# Patient Record
Sex: FEMALE | Race: WHITE | NOT HISPANIC OR LATINO | Employment: OTHER | URBAN - METROPOLITAN AREA
[De-identification: names, ages, dates, MRNs, and addresses within clinical notes are randomized per-mention and may not be internally consistent; named-entity substitution may affect disease eponyms.]

---

## 2017-10-11 NOTE — PATIENT DISCUSSION
Dry Eye Syndrome Counseling: I have discussed the diagnosis and the pathophysiology of this disease with the patient. Eyelid pathology and systemic illnesses such as Sjogren?s disease or rheumatoid arthritis may contribute to severity. Vision may be limited by dry eye, and symptoms exacerbated by environmental factors such as smoke, wind, or prolonged eye use. Treatment options include, but are not limited to, artificial tears, punctal plugs, topical cyclosporine, oral omega-3 supplements, Lipiflow, moisture goggles, and lubricating ointments. I stressed the importance of compliance with treatment.

## 2018-12-17 NOTE — PATIENT DISCUSSION
DERMATOCHALASIS BUL: Patient not bothered by uppper lids. Instructed patient to return to clinic if lids become bothersome or affecting vision. Will continue to monitor. PATIENT DEFERS LID EVALUATION AT THIS TIME.

## 2018-12-17 NOTE — PATIENT DISCUSSION
NON-EXUDATIVE ARMD, STABLE OU: Educated patient about importance of taking AREDS 2, [employer] Grid use, and UV protection. Patient should also have a healthy diet/lifestyle that includes green leafy vegetables and foods high in lutein and not to smoke any tobacco products.

## 2020-11-06 ENCOUNTER — NEW PATIENT COMPREHENSIVE (OUTPATIENT)
Dept: URBAN - METROPOLITAN AREA CLINIC 38 | Facility: CLINIC | Age: 55
End: 2020-11-06

## 2020-11-06 DIAGNOSIS — H52.03: ICD-10-CM

## 2020-11-06 DIAGNOSIS — H52.4: ICD-10-CM

## 2020-11-06 PROCEDURE — 92015 DETERMINE REFRACTIVE STATE: CPT

## 2020-11-06 PROCEDURE — 92004 COMPRE OPH EXAM NEW PT 1/>: CPT

## 2020-11-06 ASSESSMENT — KERATOMETRY
OS_AXISANGLE_DEGREES: 15
OS_K1POWER_DIOPTERS: 44
OD_K2POWER_DIOPTERS: 44.75
OD_AXISANGLE2_DEGREES: 70
OS_K2POWER_DIOPTERS: 44.75
OD_K1POWER_DIOPTERS: 44
OS_AXISANGLE2_DEGREES: 105
OD_AXISANGLE_DEGREES: 160

## 2020-11-06 ASSESSMENT — VISUAL ACUITY
OD_CC: 20/20
OS_SC: J12
OS_CC: 20/20
OD_CC: J2
OD_SC: J12
OD_SC: 20/40
OS_CC: J1
OS_SC: 20/20

## 2020-11-06 ASSESSMENT — TONOMETRY
OD_IOP_MMHG: 13
OS_IOP_MMHG: 15

## 2022-02-28 ENCOUNTER — COMPREHENSIVE EXAM (OUTPATIENT)
Dept: URBAN - METROPOLITAN AREA CLINIC 38 | Facility: CLINIC | Age: 57
End: 2022-02-28

## 2022-02-28 DIAGNOSIS — H52.4: ICD-10-CM

## 2022-02-28 DIAGNOSIS — H52.03: ICD-10-CM

## 2022-02-28 DIAGNOSIS — H52.203: ICD-10-CM

## 2022-02-28 PROCEDURE — 92015 DETERMINE REFRACTIVE STATE: CPT

## 2022-02-28 PROCEDURE — 92014 COMPRE OPH EXAM EST PT 1/>: CPT

## 2022-02-28 ASSESSMENT — VISUAL ACUITY
OS_CC: J3
OD_CC: J3
OD_CC: 20/20
OS_CC: 20/20-1

## 2022-02-28 ASSESSMENT — KERATOMETRY
OD_K2POWER_DIOPTERS: 44.75
OD_AXISANGLE_DEGREES: 160
OD_AXISANGLE2_DEGREES: 70
OD_K1POWER_DIOPTERS: 44
OS_AXISANGLE2_DEGREES: 105
OS_K2POWER_DIOPTERS: 44.75
OS_K1POWER_DIOPTERS: 44
OS_AXISANGLE_DEGREES: 15

## 2022-02-28 ASSESSMENT — TONOMETRY
OD_IOP_MMHG: 14
OS_IOP_MMHG: 14

## 2022-10-24 ENCOUNTER — EMERGENCY VISIT (OUTPATIENT)
Dept: URBAN - METROPOLITAN AREA CLINIC 38 | Facility: CLINIC | Age: 57
End: 2022-10-24

## 2022-10-24 DIAGNOSIS — H10.011: ICD-10-CM

## 2022-10-24 PROCEDURE — 92012 INTRM OPH EXAM EST PATIENT: CPT

## 2022-10-24 RX ORDER — TOBRAMYCIN AND DEXAMETHASONE 1; 3 MG/ML; MG/ML: 1 SUSPENSION/ DROPS OPHTHALMIC

## 2022-10-24 ASSESSMENT — VISUAL ACUITY
OD_CC: 20/20
OS_CC: 20/20-

## 2022-10-24 ASSESSMENT — KERATOMETRY
OS_AXISANGLE_DEGREES: 15
OS_AXISANGLE2_DEGREES: 105
OD_AXISANGLE2_DEGREES: 70
OS_K2POWER_DIOPTERS: 44.75
OS_K1POWER_DIOPTERS: 44
OD_K2POWER_DIOPTERS: 44.75
OD_K1POWER_DIOPTERS: 44
OD_AXISANGLE_DEGREES: 160

## 2022-10-24 ASSESSMENT — TONOMETRY
OS_IOP_MMHG: 14
OD_IOP_MMHG: 14

## 2022-11-10 NOTE — PATIENT DISCUSSION
At this time, the patient is not bothered, and observation recommend at this time.
Discussed condition and exacerbating conditions/situations (e.g., dry/arid environments, overhead fans, air conditioners, side effect of medications).
Early posterior capsular opacification was noted. It was not causing any decreased vision or glare at this time. No treatment was recommended at this time.
[facility] monitoring, AREDS2 vitamins, no smoking, green leafy vegetables discussed.
The patient has hypertensive retinopathy, a disorder of the retina resulting from damaged retinal blood vessels secondary to high blood pressure. Most cases require no treatment other than to control the underlying hypertension. Diagnosis, treatment and follow-up recommendations have been discussed with the patient.
no fever and no chills.

## 2023-12-29 ENCOUNTER — COMPREHENSIVE EXAM (OUTPATIENT)
Dept: URBAN - METROPOLITAN AREA CLINIC 38 | Facility: CLINIC | Age: 58
End: 2023-12-29

## 2023-12-29 DIAGNOSIS — H52.4: ICD-10-CM

## 2023-12-29 DIAGNOSIS — H52.03: ICD-10-CM

## 2023-12-29 DIAGNOSIS — H52.203: ICD-10-CM

## 2023-12-29 PROCEDURE — 92015 DETERMINE REFRACTIVE STATE: CPT

## 2023-12-29 PROCEDURE — 92014 COMPRE OPH EXAM EST PT 1/>: CPT

## 2023-12-29 ASSESSMENT — KERATOMETRY
OS_AXISANGLE_DEGREES: 15
OS_K2POWER_DIOPTERS: 44.75
OD_AXISANGLE2_DEGREES: 70
OS_K1POWER_DIOPTERS: 44
OS_AXISANGLE2_DEGREES: 105
OD_K2POWER_DIOPTERS: 44.75
OD_AXISANGLE_DEGREES: 160
OD_K1POWER_DIOPTERS: 44

## 2023-12-29 ASSESSMENT — VISUAL ACUITY
OD_CC: 20/20
OU_CC: 20/20
OU_CC: J1
OS_CC: 20/20

## 2023-12-29 ASSESSMENT — TONOMETRY
OD_IOP_MMHG: 16
OS_IOP_MMHG: 16